# Patient Record
Sex: FEMALE | Race: WHITE | NOT HISPANIC OR LATINO
[De-identification: names, ages, dates, MRNs, and addresses within clinical notes are randomized per-mention and may not be internally consistent; named-entity substitution may affect disease eponyms.]

---

## 2023-03-08 PROBLEM — Z00.00 ENCOUNTER FOR PREVENTIVE HEALTH EXAMINATION: Status: ACTIVE | Noted: 2023-03-08

## 2023-03-16 ENCOUNTER — APPOINTMENT (OUTPATIENT)
Dept: CARDIOLOGY | Facility: CLINIC | Age: 78
End: 2023-03-16
Payer: MEDICARE

## 2023-03-16 ENCOUNTER — NON-APPOINTMENT (OUTPATIENT)
Age: 78
End: 2023-03-16

## 2023-03-16 VITALS
HEIGHT: 67 IN | BODY MASS INDEX: 29.35 KG/M2 | DIASTOLIC BLOOD PRESSURE: 80 MMHG | SYSTOLIC BLOOD PRESSURE: 132 MMHG | HEART RATE: 52 BPM | WEIGHT: 187 LBS | OXYGEN SATURATION: 94 %

## 2023-03-16 VITALS — SYSTOLIC BLOOD PRESSURE: 140 MMHG | DIASTOLIC BLOOD PRESSURE: 76 MMHG

## 2023-03-16 PROCEDURE — 99203 OFFICE O/P NEW LOW 30 MIN: CPT | Mod: 25

## 2023-03-16 PROCEDURE — 93000 ELECTROCARDIOGRAM COMPLETE: CPT

## 2023-03-16 RX ORDER — ASPIRIN 325 MG/1
325 TABLET, FILM COATED ORAL DAILY
Refills: 0 | Status: DISCONTINUED | COMMUNITY
End: 2023-03-16

## 2023-03-17 NOTE — HISTORY OF PRESENT ILLNESS
[FreeTextEntry1] : Priti Hernandez is a 76 yo woman referred for follow up of hypertension after a recent admission to [facility] after awakening in the middle of the night with chest and epigastric burning that lasted about 30 minutes. Her troponins were negative x's 3 and there were no significant serial ECG changes. 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 Her symptoms have significantly improved with the addition of famotidine.  She denies chest pain or unusual SOB with exertion. She lives independently and is able to walk short distances and one flight of stairs. Her walking is limited by back pain.  Sometimes if carrying a heavy load up the stairs she has SOB. \par \par BPs prior to hospitalization had been higher than usual with systolics 140's.  She has decreased her salt intake with some improvement of BP with systolics mostly in the 130's.  Her heart rate is at times in the 40's associated with some fatigue and sometimes mild lightheadedness.\par

## 2023-03-17 NOTE — PHYSICAL EXAM
[Well Nourished] : well nourished [Normal] : normal conjunctiva [Normal Venous Pressure] : normal venous pressure [No Carotid Bruit] : no carotid bruit [Normal S1, S2] : normal S1, S2 [No Rub] : no rub [No Gallop] : no gallop [Clear Lung Fields] : clear lung fields [Soft] : abdomen soft [Non Tender] : non-tender [No Edema] : no edema [No Cyanosis] : no cyanosis [Normal Radial B/L] : normal radial B/L [Normal PT B/L] : normal PT B/L [No Rash] : no rash [Well Developed] : well developed [Normal Gait] : normal gait [de-identified] : 2/6 short LOREN at B

## 2023-03-17 NOTE — ASSESSMENT
[FreeTextEntry1] : Ms. Hernandez is a 78 yo woman with long standing hypertension, LVH by ECG and hyperlipidemia.  Recent systolics have been mildly elevated.  Current therapy has been limited by heart rate and occasional pedal edema.  Plan is to decrease bisoprolol from 5mg to 2.5 mg and increase Hctz from 6.25 to 25 mg.  We also discussed nonpharmacologic means for control of BP including salt restriction, diet, weight reduction, regular exercise and avoiding NSAID's.  \par \par With regard to her lipids with , HDL 55, we discussed pharmacologic Rx but her preference is to modify diet.  She did not tolerate statins in the past.\par \par \par Will proceed with TTE for further evaluation of systolic murmur and abnormal ECG including LVH.\par \par

## 2023-03-17 NOTE — REVIEW OF SYSTEMS
[Heartburn] : heartburn [Joint Pain] : joint pain [Negative] : Neurological [Abdominal Pain] : no abdominal pain [Vomiting] : no vomiting [Change in Appetite] : no change in appetite [Confusion] : no confusion was observed

## 2023-03-17 NOTE — DISCUSSION/SUMMARY
[EKG obtained to assist in diagnosis and management of assessed problem(s)] : EKG obtained to assist in diagnosis and management of assessed problem(s) [FreeTextEntry1] : Plans for control of BP and bradycardia are to decrease bisoprolol from 5 to 2.5 mg po daily and increase hydrochlorthiazide from 6.25 to 25 mg po daily.\par \par Eneric coated aspirin 81 mg was reduced from two to one per day.\par \par Follow-up will be in 4-6 weeks with lytes, BUN, creatinine.  She will continue to monitor her BP.\par \par Plan also includes TTE for further evaluation of systolic murmur and LVH by ECG.\par

## 2023-03-17 NOTE — CARDIOLOGY SUMMARY
[de-identified] : 3/2/23     NSR 61, LAD/LAHB, LVH,  septal MI\par 3/16/23   Sinus bradycardia, rate 52, LAD/LAHB, IVCD, LVH, possible old ASMI [de-identified] : ~ ten years ago have requested records [de-identified] : 3/2/23 minimal calcification of coronaries, cor normal size [de-identified] : ~ ten years ago have requested records, reportedly mild disease

## 2023-03-17 NOTE — REASON FOR VISIT
[FreeTextEntry3] : Vidal Vazquez [FreeTextEntry1] : Follow up of hypertension after recent hospitalization at [facility]

## 2023-03-21 ENCOUNTER — NON-APPOINTMENT (OUTPATIENT)
Age: 78
End: 2023-03-21

## 2023-03-21 RX ORDER — BISOPROLOL FUMARATE 5 MG/1
5 TABLET, FILM COATED ORAL DAILY
Qty: 45 | Refills: 0 | Status: DISCONTINUED | COMMUNITY
Start: 2023-03-17 | End: 2023-03-21

## 2023-03-21 RX ORDER — HYDROCHLOROTHIAZIDE 25 MG/1
25 TABLET ORAL DAILY
Qty: 90 | Refills: 0 | Status: DISCONTINUED | COMMUNITY
Start: 2023-03-17 | End: 2023-03-21

## 2023-04-18 ENCOUNTER — APPOINTMENT (OUTPATIENT)
Dept: CARDIOLOGY | Facility: CLINIC | Age: 78
End: 2023-04-18
Payer: MEDICARE

## 2023-04-18 VITALS
OXYGEN SATURATION: 96 % | BODY MASS INDEX: 28.88 KG/M2 | WEIGHT: 184 LBS | DIASTOLIC BLOOD PRESSURE: 72 MMHG | HEART RATE: 46 BPM | HEIGHT: 67 IN | SYSTOLIC BLOOD PRESSURE: 116 MMHG

## 2023-04-18 VITALS — SYSTOLIC BLOOD PRESSURE: 124 MMHG | DIASTOLIC BLOOD PRESSURE: 70 MMHG

## 2023-04-18 PROCEDURE — 99213 OFFICE O/P EST LOW 20 MIN: CPT

## 2023-04-18 PROCEDURE — 93306 TTE W/DOPPLER COMPLETE: CPT

## 2023-04-18 RX ORDER — CELECOXIB 200 MG/1
200 CAPSULE ORAL
Refills: 0 | Status: DISCONTINUED | COMMUNITY
End: 2023-04-18

## 2023-04-18 RX ORDER — BISOPROLOL FUMARATE AND HYDROCHLOROTHIAZIDE 5; 6.25 MG/1; MG/1
5-6.25 TABLET, FILM COATED ORAL DAILY
Qty: 1 | Refills: 0 | Status: DISCONTINUED | COMMUNITY
End: 2023-04-18

## 2023-04-18 NOTE — PHYSICAL EXAM
[Well Developed] : well developed [Well Nourished] : well nourished [No Acute Distress] : no acute distress [Normal] : normal conjunctiva [Normal Conjunctiva] : normal conjunctiva [Normal Venous Pressure] : normal venous pressure [No Carotid Bruit] : no carotid bruit [Normal S1, S2] : normal S1, S2 [No Rub] : no rub [No Gallop] : no gallop [Clear Lung Fields] : clear lung fields [Soft] : abdomen soft [Non Tender] : non-tender [Normal Gait] : normal gait [No Edema] : no edema [No Cyanosis] : no cyanosis [Normal Radial B/L] : normal radial B/L [Normal PT B/L] : normal PT B/L [No Rash] : no rash [de-identified] : 2/6 short LOREN at B

## 2023-04-18 NOTE — HISTORY OF PRESENT ILLNESS
[FreeTextEntry1] : She is doing well after switching from Hctz to spironolactone.   She lives independently. She is able to walk only short distances and one flight of stairs limited by back and hip pain.  She denies chest pain or unusual SOB with exertion.  BPs at home have been 120's to 130's.  She is doing a little better with diet and salt restriction. Her heart rate remains low 40's to 50's.  She is not having lightheadedness or dizziness.  She has occasional cough and chest fullness not related to exertion.\par

## 2023-04-18 NOTE — DISCUSSION/SUMMARY
[FreeTextEntry1] : \par Hypertension\par Plan is to decrease bisoprolol from 5mg to 2.5 mg, stop  Hctz from 6.25.  Continue nifedipine and Aldactone.  If BP is above target of  <130/80 mmHg we discussed increasing nifedipine XL to 90 mg/day.  She will continue to monitor her blood pressure and heart rate.  We also discussed nonpharmacologic means for control of BP including salt restriction, diet, weight reduction, regular exercise and avoiding NSAID's.  \par \par Hyperlipidemia\par With regard to her lipids with , , HDL 55,   her 10-year calculated ASCVD risk = 19.98 %. We discussed that the calculated 10-year ASCVD risk  is moderate to high risk. Statin or other therapy was recommended and her preference remains nonpharmacologic. We reviewed the importance of a healthy lifestyle including: weight loss, increased physical activity, dietary modification with increased fruits and vegetables, whole grain products, low-fat dairy products, fish, reduced saturated and total fat intake and restriction of alcohol and salt intake.\par \par \par Follow-up will be in 3 months or sooner prn.\par

## 2023-04-18 NOTE — ASSESSMENT
[FreeTextEntry1] : Ms. Hernandez is a 76 yo woman with long standing hypertension, LVH by ECG and hyperlipidemia.  Recent BP has been in good range.  Her heart rate remains low.  TTE shows normal biventricular systolic function, aortic sclerosis without stenosis and borderline LVH.\par \par \par

## 2023-04-18 NOTE — REASON FOR VISIT
[FreeTextEntry3] : Vidal Vazquez [FreeTextEntry1] : Follow up of hypertension and review TTE results.

## 2023-04-18 NOTE — CARDIOLOGY SUMMARY
[de-identified] : 3/2/23     NSR 61, LAD/LAHB, LVH,  septal MI\par 3/16/23   Sinus bradycardia, rate 52, LAD/LAHB, IVCD, LVH, possible old ASMI [de-identified] : 4/20/23  was personally reviewed and shows: Normal biventricular systolic function with an estimated LVEF of 55-60%.  Borderline left ventricular hypertrophy with mild grade 1 LV diastolic dysfunction.  Aortic sclerosis without stenosis.  Mild MR, TR and PA.  [de-identified] : 3/2/23 minimal calcification of coronaries, cor normal size [de-identified] : ~ ten years ago have requested records, reportedly mild disease [de-identified] : Labs  4/14/23   Ap=737, K=4.6, creat=0.71, BUN=17, eGFR=88

## 2023-07-11 ENCOUNTER — APPOINTMENT (OUTPATIENT)
Dept: CARDIOLOGY | Facility: CLINIC | Age: 78
End: 2023-07-11
Payer: MEDICARE

## 2023-07-11 VITALS
HEART RATE: 55 BPM | OXYGEN SATURATION: 94 % | WEIGHT: 164 LBS | DIASTOLIC BLOOD PRESSURE: 80 MMHG | BODY MASS INDEX: 25.74 KG/M2 | HEIGHT: 67 IN | SYSTOLIC BLOOD PRESSURE: 142 MMHG

## 2023-07-11 DIAGNOSIS — Z87.898 PERSONAL HISTORY OF OTHER SPECIFIED CONDITIONS: ICD-10-CM

## 2023-07-11 PROCEDURE — 99214 OFFICE O/P EST MOD 30 MIN: CPT

## 2023-07-11 RX ORDER — BISOPROLOL FUMARATE 5 MG/1
5 TABLET, FILM COATED ORAL DAILY
Qty: 45 | Refills: 0 | Status: DISCONTINUED | COMMUNITY
Start: 2023-04-18 | End: 2023-07-11

## 2023-07-11 NOTE — CARDIOLOGY SUMMARY
[de-identified] : 3/2/23     NSR 61, LAD/LAHB, LVH,  septal MI\par 3/16/23   Sinus bradycardia, rate 52, LAD/LAHB, IVCD, LVH, possible old ASMI [de-identified] : 4/20/23  was personally reviewed and shows: Normal biventricular systolic function with an estimated LVEF of 55-60%.  Borderline left ventricular hypertrophy with mild grade 1 LV diastolic dysfunction.  Aortic sclerosis without stenosis.  Mild MR, TR and WI.  [de-identified] : 3/2/23 minimal calcification of coronaries, cor normal size [de-identified] : ~ ten years ago have requested records, reportedly mild disease [de-identified] : Labs  4/14/23   Vy=137, K=4.6, creat=0.71, BUN=17, eGFR=88

## 2023-07-11 NOTE — HISTORY OF PRESENT ILLNESS
[FreeTextEntry1] : Her blood pressure on current medications been in good range, mostly 120–130s/60s the–70s.  Is lost almost 20 pounds.  She is being very careful with the diet.  She is following a Mediterranean type diet and has stopped eating red meats, sweets and processed foods.  She is avoiding salt and alcohol.  She has occasional mild lower extremity edema.  She lives independently. She is able to walk only short distances and one flight of stairs limited by back and hip pain.  She denies chest pain with exertion but is having shortness of breath.  She is having more fatigue and has been holding her bisoprolol on occasions.  When holding the bisoprolol blood pressure been a bit higher up to 140.  She has occasional palpitations described as a pause. She is not having lightheadedness or dizziness.  She has occasional cough and chest/abdominal fullness not related to exertion. \par

## 2023-07-11 NOTE — PHYSICAL EXAM
[Well Developed] : well developed [Well Nourished] : well nourished [No Acute Distress] : no acute distress [Normal Conjunctiva] : normal conjunctiva [Normal Venous Pressure] : normal venous pressure [No Carotid Bruit] : no carotid bruit [Normal S1, S2] : normal S1, S2 [No Rub] : no rub [Murmur] : murmur [Clear Lung Fields] : clear lung fields [Good Air Entry] : good air entry [No Respiratory Distress] : no respiratory distress  [Soft] : abdomen soft [Non Tender] : non-tender [Normal Bowel Sounds] : normal bowel sounds [Normal Gait] : normal gait [No Edema] : no edema [Normal Radial B/L] : normal radial B/L [Diminished Pedal Pulses ___] : diminished pedal pulses [unfilled] [Moves all extremities] : moves all extremities [No Focal Deficits] : no focal deficits [Normal Speech] : normal speech [Alert and Oriented] : alert and oriented [Normal memory] : normal memory [de-identified] : short 2/6 LOREN

## 2023-07-11 NOTE — DISCUSSION/SUMMARY
[FreeTextEntry1] : 1.  Fatigue, bradycardia and palpitations -Plan is to stop bisoprolol and place a 7-day event monitor.\par 2.  Hypertension: After stopping bisoprolol will increase nifedipine XL to 90 mg daily and continue Aldactone 25 mg daily.  She also requested bisoprolol on a as needed basis for palpitations.  Nonpharmacologic means for control of BP including salt restriction, diet, weight reduction, regular exercise and avoiding NSAID's were again reviewed. Check BMP on aldactone,\par 3.  Pharmacologic nuclear stress test for further evaluation of her shortness of breath with mild to moderate exertion.\par 4. With regard to her lipids , , HDL 55,  from 3/2023 her 10-year calculated ASCVD risk was 19.98 %, moderate to high risk. Her preference remains nonpharmacologic. We again reviewed the importance of a healthy lifestyle including: weight loss, increased physical activity, Mediterranean type diet with fruits and vegetables, whole grain products, low-fat dairy products, fish, reduced saturated and total fat intake and restriction of alcohol and salt intake. We will recheck lipids and LFTs on modified diet.\par 5. Follow-up will be after testing or sooner prn.\par

## 2023-07-11 NOTE — REVIEW OF SYSTEMS
[Weight Loss (___ Lbs)] : [unfilled] ~Ulb weight loss [SOB] : shortness of breath [Dyspnea on exertion] : dyspnea during exertion [Palpitations] : palpitations [Abdominal Pain] : abdominal pain [Heartburn] : heartburn [Joint Pain] : joint pain [Depression] : depression [Negative] : Eyes [Chest Discomfort] : no chest discomfort [Leg Claudication] : no intermittent leg claudication [Orthopnea] : no orthopnea [PND] : no PND [Syncope] : no syncope [Wheezing] : no wheezing [Dizziness] : no dizziness [Convulsions] : no convulsions [Limb Weakness (Paresis)] : no limb weakness (Paresis) [Speech Disturbance] : no speech disturbance [Confusion] : no confusion was observed [Memory Lapses Or Loss] : no memory lapses or loss

## 2023-07-11 NOTE — ASSESSMENT
[FreeTextEntry1] : Ms. Hernandez is a 78 yo woman with long standing hypertension, LVH by ECG and hyperlipidemia.  Recent BP has been in good range.  Her heart rate remains low.  TTE shows normal biventricular systolic function, aortic sclerosis without stenosis and borderline LVH. She is having more pronounced fatigue and [place].\par \par \par

## 2023-08-08 ENCOUNTER — APPOINTMENT (OUTPATIENT)
Dept: CARDIOLOGY | Facility: CLINIC | Age: 78
End: 2023-08-08
Payer: MEDICARE

## 2023-08-08 PROCEDURE — 78452 HT MUSCLE IMAGE SPECT MULT: CPT

## 2023-08-08 PROCEDURE — 93015 CV STRESS TEST SUPVJ I&R: CPT

## 2023-08-08 PROCEDURE — A9502: CPT

## 2023-08-17 ENCOUNTER — APPOINTMENT (OUTPATIENT)
Dept: CARDIOLOGY | Facility: CLINIC | Age: 78
End: 2023-08-17
Payer: MEDICARE

## 2023-08-17 VITALS
DIASTOLIC BLOOD PRESSURE: 70 MMHG | SYSTOLIC BLOOD PRESSURE: 134 MMHG | BODY MASS INDEX: 26.68 KG/M2 | HEART RATE: 68 BPM | OXYGEN SATURATION: 96 % | HEIGHT: 67 IN | WEIGHT: 170 LBS

## 2023-08-17 PROCEDURE — 99213 OFFICE O/P EST LOW 20 MIN: CPT

## 2023-08-17 RX ORDER — BISOPROLOL FUMARATE 5 MG/1
5 TABLET, FILM COATED ORAL
Qty: 30 | Refills: 2 | Status: DISCONTINUED | COMMUNITY
Start: 2023-07-11 | End: 2023-08-17

## 2023-08-17 NOTE — REVIEW OF SYSTEMS
[Dyspnea on exertion] : dyspnea during exertion [Chest Discomfort] : no chest discomfort [Lower Ext Edema] : lower extremity edema [Palpitations] : palpitations [Orthopnea] : no orthopnea [PND] : no PND [Syncope] : no syncope [Joint Pain] : joint pain [Joint Stiffness] : joint stiffness [Negative] : Neurological

## 2023-08-17 NOTE — ASSESSMENT
[FreeTextEntry1] : Ms. Hernandez is a 76 yo woman with long standing hypertension, LVH by ECG and hyperlipidemia.  Recent BP has been in good range. TTE shows normal biventricular systolic function, aortic sclerosis without stenosis and borderline LVH.  MPI is negative for inducible myocardial ischemia.  Symptomatically she has improved off the beta-blocker, her average heart rate off beta-blocker is 54 bpm.  She continues to have palpitations mostly isolated beats but had a recent episode that lasted a few minutes.  Her Holter in July only documented brief runs of SVT versus AT the longest 9 beats.  Despite her best dietary efforts her LDL remains elevated 132 mg/DL

## 2023-08-17 NOTE — DISCUSSION/SUMMARY
[FreeTextEntry1] : 1.  We again reviewed the importance of a healthy lifestyle including: weight loss, maintenance of normal body weight, increased physical activity with 30 to 45 minutes of exercise most days of the week, Mediterranean style diet with fresh fruits, vegetables, nuts, beans, seeds, legumes, whole grain products, lean proteins from fish and other seafood, olive oil as a primary fat source, some low-fat dairy products, some poultry and limiting consumption of sweets, highly processed foods, meats and saturated fats.  We also discussed continued restriction of salt intake. 2. With regard to her lipids , , HDL 53,  from 3/2023 her 10-year calculated ASCVD remains, moderate to high risk. However, her preference remains nonpharmacologic.  3.  Repeat event monitor, 2 weeks with concerns for PA F or more prolonged SVT. 4. Cardiology follow-up will be in 4 months or sooner prn.

## 2023-08-17 NOTE — CARDIOLOGY SUMMARY
[de-identified] : 3/2/23     NSR 61, LAD/LAHB, LVH,  septal MI\par  3/16/23   Sinus bradycardia, rate 52, LAD/LAHB, IVCD, LVH, possible old ASMI [de-identified] : Holter monitor 7/11/2023 showed heart rate varied from 35-1 29, average rate 54 bpm.  There were 9 runs of SVT versus AT with variable block.  The longest run was 9 beats at a rate of 104 bpm and the fastest was 4 beats at a rate of 129 bpm.  PACs were less than 1%.  PVCs were 1-2%. [de-identified] : Regadenoson MPI on 8/9/2023 was probably normal.  The LVEF of 62%.  There was diaphragmatic attenuation artifact but no definite evidence of ischemia or infarction. [de-identified] : 4/20/23  was personally reviewed and shows: Normal biventricular systolic function with an estimated LVEF of 55-60%.  Borderline left ventricular hypertrophy with mild grade 1 LV diastolic dysfunction.  Aortic sclerosis without stenosis.  Mild MR, TR and NH.  [de-identified] : 3/2/23 minimal calcification of coronaries, cor normal size [de-identified] : ~ ten years ago have requested records, reportedly mild disease [de-identified] : Labs 7/26/2023 K4.2, chloride 108, creatinine 0.75, calcium 9.7, eGFR 82, LFTs normal, triglycerides 110, total cholesterol 207, HDL 53 and  previously 124.

## 2023-08-17 NOTE — HISTORY OF PRESENT ILLNESS
[FreeTextEntry1] : She feels better with discontinuation of the bisoprolol and higher dose of nifedipine XL.  Her blood pressure on current medications been in good range, with systolics mostly in the 120s.  Weight is stable, she is being very careful with the diet.  She follows a Mediterranean type diet and has stopped eating red meats, sweets and processed foods.  She is avoiding salt and alcohol.  She has occasional mild lower extremity edema in the hot weather later in the day that resolves by the morning.  She lives independently. She is able to walk only short distances and one flight of stairs limited by back and hip pain.  She denies chest pain with exertion.  She has occasional palpitations described as a pause or skipped beats.  The palpitations are usually very brief although several nights ago she had an episode that lasted a few minutes.

## 2023-09-14 ENCOUNTER — APPOINTMENT (OUTPATIENT)
Dept: CARDIOLOGY | Facility: CLINIC | Age: 78
End: 2023-09-14
Payer: MEDICARE

## 2023-09-14 ENCOUNTER — TRANSCRIPTION ENCOUNTER (OUTPATIENT)
Age: 78
End: 2023-09-14

## 2023-09-14 VITALS
BODY MASS INDEX: 25.9 KG/M2 | SYSTOLIC BLOOD PRESSURE: 114 MMHG | DIASTOLIC BLOOD PRESSURE: 64 MMHG | HEIGHT: 67 IN | WEIGHT: 165 LBS | OXYGEN SATURATION: 91 % | HEART RATE: 67 BPM

## 2023-09-14 PROCEDURE — 99214 OFFICE O/P EST MOD 30 MIN: CPT

## 2023-09-14 RX ORDER — FAMOTIDINE 40 MG/1
40 TABLET, FILM COATED ORAL
Refills: 0 | Status: DISCONTINUED | COMMUNITY
End: 2023-09-14

## 2023-10-04 ENCOUNTER — APPOINTMENT (OUTPATIENT)
Dept: ELECTROPHYSIOLOGY | Facility: CLINIC | Age: 78
End: 2023-10-04
Payer: MEDICARE

## 2023-10-04 VITALS
WEIGHT: 166 LBS | SYSTOLIC BLOOD PRESSURE: 118 MMHG | DIASTOLIC BLOOD PRESSURE: 62 MMHG | HEART RATE: 76 BPM | OXYGEN SATURATION: 98 % | HEIGHT: 67 IN | BODY MASS INDEX: 26.06 KG/M2

## 2023-10-04 DIAGNOSIS — R06.02 SHORTNESS OF BREATH: ICD-10-CM

## 2023-10-04 PROCEDURE — 99204 OFFICE O/P NEW MOD 45 MIN: CPT

## 2023-10-07 PROBLEM — R06.02 SOBOE (SHORTNESS OF BREATH ON EXERTION): Status: ACTIVE | Noted: 2023-07-11

## 2023-10-17 ENCOUNTER — APPOINTMENT (OUTPATIENT)
Dept: CARDIOLOGY | Facility: CLINIC | Age: 78
End: 2023-10-17
Payer: MEDICARE

## 2023-10-17 VITALS
DIASTOLIC BLOOD PRESSURE: 76 MMHG | BODY MASS INDEX: 26.31 KG/M2 | SYSTOLIC BLOOD PRESSURE: 122 MMHG | WEIGHT: 168 LBS | OXYGEN SATURATION: 98 % | HEART RATE: 64 BPM

## 2023-10-17 PROCEDURE — 99212 OFFICE O/P EST SF 10 MIN: CPT

## 2023-10-17 RX ORDER — METOPROLOL SUCCINATE 25 MG/1
25 TABLET, EXTENDED RELEASE ORAL
Qty: 90 | Refills: 1 | Status: ACTIVE | COMMUNITY
Start: 2023-09-14

## 2023-10-17 RX ORDER — MAGNESIUM CHLORIDE 64 MG
70-117 TABLET, DELAYED RELEASE (ENTERIC COATED) ORAL
Qty: 180 | Refills: 3 | Status: ACTIVE | COMMUNITY
Start: 2023-10-17 | End: 1900-01-01

## 2023-11-08 ENCOUNTER — APPOINTMENT (OUTPATIENT)
Dept: CARDIOLOGY | Facility: CLINIC | Age: 78
End: 2023-11-08
Payer: MEDICARE

## 2023-11-08 VITALS
HEART RATE: 58 BPM | WEIGHT: 166 LBS | BODY MASS INDEX: 26.06 KG/M2 | DIASTOLIC BLOOD PRESSURE: 60 MMHG | HEIGHT: 67 IN | SYSTOLIC BLOOD PRESSURE: 102 MMHG | OXYGEN SATURATION: 97 %

## 2023-11-08 DIAGNOSIS — Z82.49 FAMILY HISTORY OF ISCHEMIC HEART DISEASE AND OTHER DISEASES OF THE CIRCULATORY SYSTEM: ICD-10-CM

## 2023-11-08 DIAGNOSIS — Z82.3 FAMILY HISTORY OF STROKE: ICD-10-CM

## 2023-11-08 DIAGNOSIS — Z72.3 LACK OF PHYSICAL EXERCISE: ICD-10-CM

## 2023-11-08 DIAGNOSIS — Z87.891 PERSONAL HISTORY OF NICOTINE DEPENDENCE: ICD-10-CM

## 2023-11-08 DIAGNOSIS — Z78.9 OTHER SPECIFIED HEALTH STATUS: ICD-10-CM

## 2023-11-08 PROCEDURE — 99214 OFFICE O/P EST MOD 30 MIN: CPT

## 2023-11-12 PROBLEM — Z87.891 FORMER SMOKER: Status: ACTIVE | Noted: 2023-03-16

## 2023-11-12 PROBLEM — Z82.49 FAMILY HISTORY OF HEART FAILURE: Status: ACTIVE | Noted: 2023-03-16

## 2023-11-12 PROBLEM — Z78.9 RARELY CONSUMES ALCOHOL: Status: ACTIVE | Noted: 2023-03-16

## 2023-11-12 PROBLEM — Z72.3 DOES NOT EXERCISE: Status: ACTIVE | Noted: 2023-03-16

## 2023-11-12 PROBLEM — Z82.3 FAMILY HISTORY OF CEREBROVASCULAR ACCIDENT (CVA): Status: ACTIVE | Noted: 2023-03-16

## 2023-11-28 ENCOUNTER — APPOINTMENT (OUTPATIENT)
Dept: CARDIOLOGY | Facility: CLINIC | Age: 78
End: 2023-11-28

## 2024-03-18 RX ORDER — SPIRONOLACTONE 25 MG/1
25 TABLET ORAL DAILY
Qty: 90 | Refills: 1 | Status: ACTIVE | COMMUNITY
Start: 2023-03-21 | End: 1900-01-01

## 2024-04-11 ENCOUNTER — APPOINTMENT (OUTPATIENT)
Dept: CARDIOLOGY | Facility: CLINIC | Age: 79
End: 2024-04-11
Payer: MEDICARE

## 2024-04-11 ENCOUNTER — NON-APPOINTMENT (OUTPATIENT)
Age: 79
End: 2024-04-11

## 2024-04-11 VITALS — SYSTOLIC BLOOD PRESSURE: 136 MMHG | DIASTOLIC BLOOD PRESSURE: 78 MMHG

## 2024-04-11 VITALS
WEIGHT: 166 LBS | BODY MASS INDEX: 26.06 KG/M2 | HEART RATE: 54 BPM | DIASTOLIC BLOOD PRESSURE: 70 MMHG | HEIGHT: 67 IN | SYSTOLIC BLOOD PRESSURE: 142 MMHG

## 2024-04-11 DIAGNOSIS — R53.83 OTHER FATIGUE: ICD-10-CM

## 2024-04-11 PROCEDURE — 99214 OFFICE O/P EST MOD 30 MIN: CPT

## 2024-04-11 PROCEDURE — 93000 ELECTROCARDIOGRAM COMPLETE: CPT

## 2024-04-11 PROCEDURE — G2211 COMPLEX E/M VISIT ADD ON: CPT

## 2024-04-11 RX ORDER — NIFEDIPINE 90 MG/1
90 TABLET, EXTENDED RELEASE ORAL DAILY
Qty: 90 | Refills: 3 | Status: DISCONTINUED | COMMUNITY
Start: 1900-01-01 | End: 2024-04-11

## 2024-04-11 RX ORDER — ASPIRIN 81 MG
81 TABLET, DELAYED RELEASE (ENTERIC COATED) ORAL TWICE DAILY
Refills: 0 | Status: ACTIVE | COMMUNITY

## 2024-04-11 NOTE — REASON FOR VISIT
[Symptom and Test Evaluation] : symptom and test evaluation [Arrhythmia/ECG Abnorrmalities] : arrhythmia/ECG abnormalities [Hyperlipidemia] : hyperlipidemia [Hypertension] : hypertension [FreeTextEntry3] : Dr. Julio

## 2024-04-11 NOTE — CARDIOLOGY SUMMARY
[de-identified] : 3/2/23     NSR 61, LAD/LAHB, LVH,  septal MI 3/16/23   Sinus bradycardia, rate 52, LAD/LAHB, IVCD, LVH, possible old ASMI April 11, 2024.  Sinus bradycardia.  LAD/BRYAN.  IVCD LVH [de-identified] : Holter monitor 7/11/2023 showed heart rate varied from 35-1 29, average rate 54 bpm.  There were 9 runs of SVT versus AT with variable block.  The longest run was 9 beats at a rate of 104 bpm and the fastest was 4 beats at a rate of 129 bpm.  PACs were less than 1%.  PVCs were 1-2%. Event monitor.  Range 40-97 average 56 PVC burden 2.8% PSVT 21 episodes [de-identified] : Regadenoson MPI on 8/9/2023 was probably normal.  The LVEF of 62%.  There was diaphragmatic attenuation artifact but no definite evidence of ischemia or infarction. [de-identified] : 4/20/23  was personally reviewed and shows: Normal biventricular systolic function with an estimated LVEF of 55-60%.  Borderline left ventricular hypertrophy with mild grade 1 LV diastolic dysfunction.  Aortic sclerosis without stenosis.  Mild MR, TR and HI.  September 2024.  Normal sinus rhythm ranging from 40-97.  Average 56.  PVC burden 2.8.  Multiple episodes of PSVT longest lasting for 15 seconds. [de-identified] : 3/2/23 minimal calcification of coronaries, cor normal size [de-identified] : ~ ten years ago have requested records, reportedly mild disease [de-identified] : Labs 7/26/2023 K4.2, chloride 108, creatinine 0.75, calcium 9.7, eGFR 82, LFTs normal, triglycerides 110, total cholesterol 207, HDL 53 and  previously 124.

## 2024-04-11 NOTE — DISCUSSION/SUMMARY
[FreeTextEntry1] : 78-year-old female with above medical history active medical problems as noted below 1.  Symptomatic PSVT.  Baseline bradycardia.  Continue metoprolol.  Change nifedipine to amlodipine 2 improve on reflex tachycardia.  Repeat 2 weeks event monitor.  Rule out any PAF.  Considering difficulty in management with medication if there are prolonged episodes of PSVT may need to consider EP guided intervention with heart rhythm specialist. 2.  Essential hypertension.  Hypertensive heart disease.  No CHF.  No CKD.  Non-smoker.  Change nifedipine to amlodipine to try to decrease reflex tachycardia.  Goal less than 130/80.  Repeat blood pressure by me about 136/78.  Lifestyle modification reviewed. 3.  Coronary calcification mild.  Abnormal EKG at baseline.  Stable ischemic evaluation in 2023.  Preserved LV systolic function.  No signs of unstable CAD.  Continue aspirin.  Hypertension management.  Lipid management as discussed.  Check on lipid panel.  If LDL cholesterol remains greater than 70 consider statin therapy.  counseling regarding low saturated fat, salt and carbohydrate intake was reviewed. Active lifestyle and regular. Exercise along with weight management is advised. All the above were at length reviewed. Answered all the questions. Thank you very much for this kind referral. Please do not hesitate to give me a call for any question. Part of this transcription was done with voice recognition software and phonetically similar errors are common. I apologize for that. Please do not hesitate to call for any questions due to above.  Sincerely,  Susana Ward MD, FACC, [place] [EKG obtained to assist in diagnosis and management of assessed problem(s)] : EKG obtained to assist in diagnosis and management of assessed problem(s)

## 2024-04-11 NOTE — PHYSICAL EXAM
[Normal] : well developed, well nourished, no acute distress [Normal S1, S2] : normal S1, S2 [No Rub] : no rub [No Gallop] : no gallop [Clear Lung Fields] : clear lung fields [Moves all extremities] : moves all extremities [Alert and Oriented] : alert and oriented [No Edema] : no edema [Normal Radial B/L] : normal radial B/L [Normal Speech] : normal speech [de-identified] : Systolic ejection murmur [de-identified] : Warm to touch

## 2024-04-11 NOTE — ASSESSMENT
[FreeTextEntry1] : Reviewed on April 11, 2024. EP consultation report reviewed Event monitor reviewed EKG reviewed Last labs from September reviewed.

## 2024-04-11 NOTE — HISTORY OF PRESENT ILLNESS
[FreeTextEntry1] : 78-year-old is seen in the office for consultation after multiple office visit for PSVT/sinus bradycardia which includes in hospital admission.  Was seen by EP specialist.  Recommendations were made.  She is very inactive.  Does try to control her diet.  Stable weight. Continued fatigue.  But no chest pain.  No PND orthopnea.  Does have palpitations.  No near syncopal or syncopal event.  No significant pedal edema. Her medical history includes PSVT Sinus bradycardia Abnormal EKG Dyslipidemia Hypertensive heart disease Coronary artery disease with mild coronary calcification.

## 2024-05-22 ENCOUNTER — APPOINTMENT (OUTPATIENT)
Dept: CARDIOLOGY | Facility: CLINIC | Age: 79
End: 2024-05-22
Payer: MEDICARE

## 2024-05-22 VITALS
BODY MASS INDEX: 26.68 KG/M2 | SYSTOLIC BLOOD PRESSURE: 142 MMHG | HEART RATE: 59 BPM | HEIGHT: 67 IN | WEIGHT: 170 LBS | DIASTOLIC BLOOD PRESSURE: 70 MMHG | OXYGEN SATURATION: 97 %

## 2024-05-22 DIAGNOSIS — I49.3 VENTRICULAR PREMATURE DEPOLARIZATION: ICD-10-CM

## 2024-05-22 DIAGNOSIS — R00.2 PALPITATIONS: ICD-10-CM

## 2024-05-22 PROCEDURE — 99214 OFFICE O/P EST MOD 30 MIN: CPT

## 2024-05-22 NOTE — REVIEW OF SYSTEMS
[Dyspnea on exertion] : dyspnea during exertion [Chest Discomfort] : no chest discomfort [Lower Ext Edema] : lower extremity edema [Palpitations] : palpitations [Orthopnea] : no orthopnea [PND] : no PND [Syncope] : no syncope [Joint Pain] : joint pain [Joint Stiffness] : joint stiffness [Negative] : Neurological [FreeTextEntry5] : As per HPI

## 2024-05-22 NOTE — CARDIOLOGY SUMMARY
[de-identified] : 3/2/23     NSR 61, LAD/LAHB, LVH,  septal MI 3/16/23   Sinus bradycardia, rate 52, LAD/LAHB, IVCD, LVH, possible old ASMI April 11, 2024.  Sinus bradycardia.  LAD/BRYAN.  IVCD LVH [de-identified] : Holter monitor 7/11/2023 showed heart rate varied from 35-1 29, average rate 54 bpm.  There were 9 runs of SVT versus AT with variable block.  The longest run was 9 beats at a rate of 104 bpm and the fastest was 4 beats at a rate of 129 bpm.  PACs were less than 1%.  PVCs were 1-2%. Event monitor.  Range 40-97 average 56 PVC burden 2.8% PSVT 21 episodes  CHESTER 4/2024 SR 36-92bpm avg HR 48bpm, PVC burden 9.2% [de-identified] : Regadenoson MPI on 8/9/2023 was probably normal.  The LVEF of 62%.  There was diaphragmatic attenuation artifact but no definite evidence of ischemia or infarction. [de-identified] : 4/20/23  was personally reviewed and shows: Normal biventricular systolic function with an estimated LVEF of 55-60%.  Borderline left ventricular hypertrophy with mild grade 1 LV diastolic dysfunction.  Aortic sclerosis without stenosis.  Mild MR, TR and KS.  September 2024.  Normal sinus rhythm ranging from 40-97.  Average 56.  PVC burden 2.8.  Multiple episodes of PSVT longest lasting for 15 seconds. [de-identified] : 3/2/23 minimal calcification of coronaries, cor normal size [de-identified] : ~ ten years ago have requested records, reportedly mild disease [de-identified] : Labs 7/26/2023 K4.2, chloride 108, creatinine 0.75, calcium 9.7, eGFR 82, LFTs normal, triglycerides 110, total cholesterol 207, HDL 53 and  previously 124.

## 2024-05-22 NOTE — DISCUSSION/SUMMARY
[FreeTextEntry1] : BRAYAN RAE  is a 79 year F  who presents today May 22, 2024 with the above history and the following active issues.   PSVT, PVC's and baseline bradycardia.  Continue metoprolol and amlodipine. Follow-up echo for reassessment of resting heart structure and function  Essential hypertension.  Hypertensive heart disease.   Blood pressure well controlled on my assessment 120/60. Goal less than 130/80.  Repeat blood pressure by me about 136/78.  Lifestyle modification reviewed.  Coronary calcification mild.  Abnormal EKG at baseline.  Stable ischemic evaluation in 2023.  Preserved LV systolic function.  No signs of unstable CAD.  Continue aspirin.  Hypertension management.  Lipid management as discussed.  Check on lipid panel.  If LDL cholesterol remains greater than 70 consider statin therapy.  Red flag symptoms which would warrant sooner emergent evaluation reviewed with the patient.  Questions and concerns were addressed and answered.   Sincerely,  Mary Lou Muñoz PA-C Patients history, testing and plan reviewed with supervising MD: Dr. Susana Ward

## 2024-05-22 NOTE — PHYSICAL EXAM
[Normal] : well developed, well nourished, no acute distress [Normal S1, S2] : normal S1, S2 [No Rub] : no rub [No Gallop] : no gallop [Clear Lung Fields] : clear lung fields [No Edema] : no edema [Normal Radial B/L] : normal radial B/L [Normal Speech] : normal speech [Alert and Oriented] : alert and oriented [de-identified] : Systolic ejection murmur [de-identified] : Warm to touch

## 2024-05-22 NOTE — HISTORY OF PRESENT ILLNESS
[FreeTextEntry1] : BRAYAN RAE  is a 79 year F  who presents today May 22, 2024 in clinical follow-up and to review [place]. At last visit Nifedipine was changed to Amlodipine. Tolerating medication adjustment well without any adverse effects. Palpitations have improved. No symptoms of lightheadedness or dizziness.   Today she denies chest pain, pressure, unusual shortness of breath, lightheadedness, dizziness, near syncope or syncope.   Her medical history includes PSVT Sinus bradycardia Abnormal EKG Dyslipidemia Hypertensive heart disease Coronary artery disease with mild coronary calcification.

## 2024-05-30 RX ORDER — AMLODIPINE BESYLATE 5 MG/1
5 TABLET ORAL DAILY
Qty: 90 | Refills: 3 | Status: ACTIVE | COMMUNITY
Start: 2024-04-11 | End: 1900-01-01

## 2024-06-07 ENCOUNTER — OFFICE (OUTPATIENT)
Dept: URBAN - METROPOLITAN AREA CLINIC 8 | Facility: CLINIC | Age: 79
Setting detail: OPHTHALMOLOGY
End: 2024-06-07
Payer: COMMERCIAL

## 2024-06-07 DIAGNOSIS — H02.135: ICD-10-CM

## 2024-06-07 DIAGNOSIS — H52.4: ICD-10-CM

## 2024-06-07 DIAGNOSIS — Z96.1: ICD-10-CM

## 2024-06-07 DIAGNOSIS — H11.153: ICD-10-CM

## 2024-06-07 DIAGNOSIS — H35.371: ICD-10-CM

## 2024-06-07 DIAGNOSIS — H35.033: ICD-10-CM

## 2024-06-07 DIAGNOSIS — H50.52: ICD-10-CM

## 2024-06-07 DIAGNOSIS — H16.223: ICD-10-CM

## 2024-06-07 PROCEDURE — 92015 DETERMINE REFRACTIVE STATE: CPT | Performed by: OPHTHALMOLOGY

## 2024-06-07 PROCEDURE — 92134 CPTRZ OPH DX IMG PST SGM RTA: CPT | Performed by: OPHTHALMOLOGY

## 2024-06-07 PROCEDURE — 92014 COMPRE OPH EXAM EST PT 1/>: CPT | Performed by: OPHTHALMOLOGY

## 2024-06-07 ASSESSMENT — LID POSITION - COMMENTS: OS_COMMENTS: HORIZONTAL LID LAXITY

## 2024-06-07 ASSESSMENT — CONFRONTATIONAL VISUAL FIELD TEST (CVF)
OS_FINDINGS: FULL
OD_FINDINGS: FULL

## 2024-06-07 ASSESSMENT — LID POSITION - ECTROPION: OS_ECTROPION: LLL T 1+

## 2024-06-14 ENCOUNTER — APPOINTMENT (OUTPATIENT)
Dept: CARDIOLOGY | Facility: CLINIC | Age: 79
End: 2024-06-14
Payer: MEDICARE

## 2024-06-14 VITALS
OXYGEN SATURATION: 98 % | SYSTOLIC BLOOD PRESSURE: 154 MMHG | WEIGHT: 172 LBS | BODY MASS INDEX: 26.94 KG/M2 | HEART RATE: 58 BPM | DIASTOLIC BLOOD PRESSURE: 62 MMHG

## 2024-06-14 DIAGNOSIS — I47.10 SUPRAVENTRICULAR TACHYCARDIA, UNSPECIFIED: ICD-10-CM

## 2024-06-14 DIAGNOSIS — R01.1 CARDIAC MURMUR, UNSPECIFIED: ICD-10-CM

## 2024-06-14 DIAGNOSIS — E78.5 HYPERLIPIDEMIA, UNSPECIFIED: ICD-10-CM

## 2024-06-14 DIAGNOSIS — I10 ESSENTIAL (PRIMARY) HYPERTENSION: ICD-10-CM

## 2024-06-14 DIAGNOSIS — I11.9 HYPERTENSIVE HEART DISEASE W/OUT HEART FAILURE: ICD-10-CM

## 2024-06-14 DIAGNOSIS — I51.7 CARDIOMEGALY: ICD-10-CM

## 2024-06-14 DIAGNOSIS — R00.1 BRADYCARDIA, UNSPECIFIED: ICD-10-CM

## 2024-06-14 PROCEDURE — 99213 OFFICE O/P EST LOW 20 MIN: CPT

## 2024-06-14 NOTE — HISTORY OF PRESENT ILLNESS
[FreeTextEntry1] : PRITI RAE  is a 79 year F with PMHx: PSVT, SB, Abn. EKG, Dyslipidemia, Hypertensive Heart Disease, CAD with mild coronary calcification.   Priti presents in clinical follow up today for blood pressure evaluation after medication changes.  She was recently started on Amlodipine, and Nifedipine has been discontinued. Pt has kept a home BP log with SBPs 120's, DBPs 60s-80. She will provide a copy for our office.  On arrival to office LUE sitting 154/62. Home BP cuff reading 160/64.  On my evaluation LUE sitting 135/60. Home BP cuff reading 150/68. We discussed the discrepancy between manual BP readings and home cuff so that pt may calibrate appropriately for accurate BP readings at home.   She is tolerating amlodipine well. No side effects. There is no lower extremity edema on my exam.   Today she denies any complaints. Denies chest pain, pressure, unusual shortness of breath, lightheadedness, dizziness, near syncope or syncope. She denies any palpitations.

## 2024-06-14 NOTE — DISCUSSION/SUMMARY
[FreeTextEntry1] : BRAYAN RAE is a 79 year old F who presents today Jun 14, 2024 with the above history and the following active issues:   PSVT, PVC's and baseline bradycardia.  Continue metoprolol and amlodipine. Follow-up echo November of this year for reassessment of resting heart structure and function.   Essential hypertension.  Hypertensive heart disease.   Blood pressure borderline controlled on my assessment 135/60. Goal less than 130/80.   Her home BP log will be sent to the office and scanned into chart. Per patient she typically has SBPs 120's, DBPs 60s-80. Her BP is higher in office today.  She is tolerating Amlodipine well without any side effects. Lifestyle modifications and nonpharmacologic methods of BP reduction were discussed.   Coronary calcification mild.  Abnormal EKG at baseline. Stable ischemic evaluation in 2023.  Preserved LV systolic function.  No signs of unstable CAD.  Continue current medications. LDL was 132 in May of this year. ASCVD 28%. Despite pt age may be beneficial to consider statin therapy.   Red flag symptoms which would warrant sooner emergent evaluation reviewed with the patient.  Questions and concerns were addressed and answered.   Sincerely,   Bia Gray, [facility] Patient's history, testing, and plan reviewed with supervising MD: Dr. Blas Alva

## 2024-06-14 NOTE — PHYSICAL EXAM
[Well Developed] : well developed [Normal Gait] : normal gait [No Edema] : no edema [Moves all extremities] : moves all extremities [Normal Speech] : normal speech [Alert and Oriented] : alert and oriented [Normal memory] : normal memory

## 2024-11-07 PROBLEM — H25.13 NUCLEAR SCLEROSIS NOT VISUALLY SIGNIFICANT; BOTH EYES: Status: ACTIVE | Noted: 2024-11-05

## 2024-11-20 ENCOUNTER — APPOINTMENT (OUTPATIENT)
Dept: CARDIOLOGY | Facility: CLINIC | Age: 79
End: 2024-11-20
Payer: MEDICARE

## 2024-11-20 ENCOUNTER — NON-APPOINTMENT (OUTPATIENT)
Age: 79
End: 2024-11-20

## 2024-11-20 VITALS
BODY MASS INDEX: 28.25 KG/M2 | HEIGHT: 67 IN | OXYGEN SATURATION: 96 % | DIASTOLIC BLOOD PRESSURE: 78 MMHG | WEIGHT: 180 LBS | SYSTOLIC BLOOD PRESSURE: 110 MMHG | HEART RATE: 57 BPM

## 2024-11-20 DIAGNOSIS — E78.5 HYPERLIPIDEMIA, UNSPECIFIED: ICD-10-CM

## 2024-11-20 DIAGNOSIS — I49.3 VENTRICULAR PREMATURE DEPOLARIZATION: ICD-10-CM

## 2024-11-20 DIAGNOSIS — I47.10 SUPRAVENTRICULAR TACHYCARDIA, UNSPECIFIED: ICD-10-CM

## 2024-11-20 DIAGNOSIS — R00.1 BRADYCARDIA, UNSPECIFIED: ICD-10-CM

## 2024-11-20 DIAGNOSIS — I11.9 HYPERTENSIVE HEART DISEASE W/OUT HEART FAILURE: ICD-10-CM

## 2024-11-20 PROCEDURE — 93306 TTE W/DOPPLER COMPLETE: CPT

## 2024-11-20 PROCEDURE — 99214 OFFICE O/P EST MOD 30 MIN: CPT

## 2024-11-20 PROCEDURE — G2211 COMPLEX E/M VISIT ADD ON: CPT

## 2024-11-20 RX ORDER — CARBIDOPA AND LEVODOPA 25; 100 MG/1; MG/1
25-100 TABLET ORAL
Refills: 0 | Status: ACTIVE | COMMUNITY
Start: 2024-11-20

## 2024-12-05 ENCOUNTER — NON-APPOINTMENT (OUTPATIENT)
Age: 79
End: 2024-12-05

## 2024-12-30 ENCOUNTER — APPOINTMENT (OUTPATIENT)
Dept: CARDIOLOGY | Facility: CLINIC | Age: 79
End: 2024-12-30
Payer: MEDICARE

## 2024-12-30 VITALS
RESPIRATION RATE: 14 BRPM | HEART RATE: 93 BPM | SYSTOLIC BLOOD PRESSURE: 118 MMHG | WEIGHT: 175 LBS | DIASTOLIC BLOOD PRESSURE: 72 MMHG | OXYGEN SATURATION: 97 % | BODY MASS INDEX: 27.47 KG/M2 | HEIGHT: 67 IN

## 2024-12-30 DIAGNOSIS — E78.5 HYPERLIPIDEMIA, UNSPECIFIED: ICD-10-CM

## 2024-12-30 DIAGNOSIS — I47.10 SUPRAVENTRICULAR TACHYCARDIA, UNSPECIFIED: ICD-10-CM

## 2024-12-30 DIAGNOSIS — I10 ESSENTIAL (PRIMARY) HYPERTENSION: ICD-10-CM

## 2024-12-30 DIAGNOSIS — R06.02 SHORTNESS OF BREATH: ICD-10-CM

## 2024-12-30 DIAGNOSIS — R00.2 PALPITATIONS: ICD-10-CM

## 2024-12-30 DIAGNOSIS — G20.A1 PARKINSON'S DISEASE WITHOUT DYSKINESIA, WITHOUT MENTION OF FLUCTUATIONS: ICD-10-CM

## 2024-12-30 DIAGNOSIS — R53.83 OTHER FATIGUE: ICD-10-CM

## 2024-12-30 DIAGNOSIS — I51.7 CARDIOMEGALY: ICD-10-CM

## 2024-12-30 PROCEDURE — G2211 COMPLEX E/M VISIT ADD ON: CPT

## 2024-12-30 PROCEDURE — 99214 OFFICE O/P EST MOD 30 MIN: CPT

## 2025-05-19 ENCOUNTER — NON-APPOINTMENT (OUTPATIENT)
Age: 80
End: 2025-05-19

## 2025-05-20 ENCOUNTER — NON-APPOINTMENT (OUTPATIENT)
Age: 80
End: 2025-05-20

## 2025-05-20 ENCOUNTER — APPOINTMENT (OUTPATIENT)
Dept: CARDIOLOGY | Facility: CLINIC | Age: 80
End: 2025-05-20
Payer: MEDICARE

## 2025-05-20 VITALS
BODY MASS INDEX: 27.47 KG/M2 | HEART RATE: 61 BPM | OXYGEN SATURATION: 98 % | DIASTOLIC BLOOD PRESSURE: 88 MMHG | HEIGHT: 67 IN | WEIGHT: 175 LBS | SYSTOLIC BLOOD PRESSURE: 142 MMHG

## 2025-05-20 DIAGNOSIS — I11.9 HYPERTENSIVE HEART DISEASE W/OUT HEART FAILURE: ICD-10-CM

## 2025-05-20 DIAGNOSIS — R53.83 OTHER FATIGUE: ICD-10-CM

## 2025-05-20 DIAGNOSIS — I49.3 VENTRICULAR PREMATURE DEPOLARIZATION: ICD-10-CM

## 2025-05-20 DIAGNOSIS — I47.10 SUPRAVENTRICULAR TACHYCARDIA, UNSPECIFIED: ICD-10-CM

## 2025-05-20 DIAGNOSIS — R00.1 BRADYCARDIA, UNSPECIFIED: ICD-10-CM

## 2025-05-20 DIAGNOSIS — E78.5 HYPERLIPIDEMIA, UNSPECIFIED: ICD-10-CM

## 2025-05-20 DIAGNOSIS — I27.20 PULMONARY HYPERTENSION, UNSPECIFIED: ICD-10-CM

## 2025-05-20 PROCEDURE — 99214 OFFICE O/P EST MOD 30 MIN: CPT

## 2025-05-20 PROCEDURE — G2211 COMPLEX E/M VISIT ADD ON: CPT

## 2025-05-20 PROCEDURE — 93000 ELECTROCARDIOGRAM COMPLETE: CPT

## 2025-06-12 ENCOUNTER — OFFICE (OUTPATIENT)
Dept: URBAN - METROPOLITAN AREA CLINIC 8 | Facility: CLINIC | Age: 80
Setting detail: OPHTHALMOLOGY
End: 2025-06-12
Payer: COMMERCIAL

## 2025-06-12 VITALS — HEIGHT: 55 IN

## 2025-06-12 DIAGNOSIS — H16.223: ICD-10-CM

## 2025-06-12 DIAGNOSIS — H35.363: ICD-10-CM

## 2025-06-12 DIAGNOSIS — H52.4: ICD-10-CM

## 2025-06-12 DIAGNOSIS — Z96.1: ICD-10-CM

## 2025-06-12 DIAGNOSIS — H35.371: ICD-10-CM

## 2025-06-12 PROCEDURE — 92015 DETERMINE REFRACTIVE STATE: CPT | Performed by: OPHTHALMOLOGY

## 2025-06-12 PROCEDURE — 92134 CPTRZ OPH DX IMG PST SGM RTA: CPT | Performed by: OPHTHALMOLOGY

## 2025-06-12 PROCEDURE — 92014 COMPRE OPH EXAM EST PT 1/>: CPT | Performed by: OPHTHALMOLOGY

## 2025-06-12 ASSESSMENT — REFRACTION_CURRENTRX
OD_VPRISM_DIRECTION: PROGS
OS_CYLINDER: -1.00
OD_SPHERE: -0.25
OD_AXIS: 095
OS_OVR_VA: 20/
OD_ADD: +2.75
OD_CYLINDER: -1.00
OS_VPRISM_DIRECTION: PROGS
OS_ADD: +2.75
OD_OVR_VA: 20/
OS_SPHERE: +0.50
OS_AXIS: 088

## 2025-06-12 ASSESSMENT — REFRACTION_MANIFEST
OU_VA: 20/NI
OD_VA2: 20/25(J1)
OD_ADD: +3.00
OU_VA: 20/NI
OD_AXIS: 090
OD_SPHERE: PLANO
OD_VPRISM_DIRECTION: BI
OD_HPRISM: 2.5
OD_CYLINDER: -0.75
OS_CYLINDER: -1.25
OD_VA1: 20/25
OS_ADD: +2.75
OD_AXIS: 090
OD_CYLINDER: -0.75
OS_VPRISM_DIRECTION: BI
OS_AXIS: 085
OD_SPHERE: PLANO
OS_SPHERE: +0.25
OS_VPRISM_DIRECTION: BI
OS_ADD: +3.00
OS_SPHERE: +0.25
OD_ADD: +2.75
OD_VPRISM_DIRECTION: BI
OS_VA1: 20/25+
OS_HPRISM: 2.5
OS_VA2: 20/25(J1)
OS_AXIS: 085
OD_VA1: 20/25
OS_HPRISM: 2.5
OS_CYLINDER: -1.25
OD_HPRISM: 2.5
OS_VA2: 20/25(J1)
OS_VA1: 20/25+
OD_VA2: 20/25(J1)

## 2025-06-12 ASSESSMENT — REFRACTION_AUTOREFRACTION
OD_AXIS: 089
OS_SPHERE: +0.75
OD_CYLINDER: -1.00
OD_SPHERE: +0.25
OS_CYLINDER: -1.50
OS_AXIS: 083

## 2025-06-12 ASSESSMENT — VISUAL ACUITY
OD_BCVA: 20/30
OS_BCVA: 20/40

## 2025-06-12 ASSESSMENT — LID POSITION - ECTROPION: OS_ECTROPION: LLL T 1+

## 2025-06-12 ASSESSMENT — CONFRONTATIONAL VISUAL FIELD TEST (CVF)
OS_FINDINGS: FULL
OD_FINDINGS: FULL

## 2025-06-12 ASSESSMENT — KERATOMETRY
OS_K1POWER_DIOPTERS: 43.75
OS_K2POWER_DIOPTERS: 44.50
OD_K1POWER_DIOPTERS: 43.75
METHOD_AUTO_MANUAL: AUTO
OD_AXISANGLE_DEGREES: 090
OS_AXISANGLE_DEGREES: 010
OD_K2POWER_DIOPTERS: 43.75

## 2025-06-12 ASSESSMENT — SUPERFICIAL PUNCTATE KERATITIS (SPK)
OS_SPK: 1+
OD_SPK: 1+

## 2025-06-12 ASSESSMENT — LID POSITION - COMMENTS: OS_COMMENTS: HORIZONTAL LID LAXITY

## 2025-08-29 ENCOUNTER — NON-APPOINTMENT (OUTPATIENT)
Age: 80
End: 2025-08-29

## 2025-09-12 ENCOUNTER — RX RENEWAL (OUTPATIENT)
Age: 80
End: 2025-09-12